# Patient Record
Sex: FEMALE | Race: WHITE
[De-identification: names, ages, dates, MRNs, and addresses within clinical notes are randomized per-mention and may not be internally consistent; named-entity substitution may affect disease eponyms.]

---

## 2018-09-04 ENCOUNTER — HOSPITAL ENCOUNTER (OUTPATIENT)
Dept: HOSPITAL 47 - RADMAMWWP | Age: 58
Discharge: HOME | End: 2018-09-04
Attending: OBSTETRICS & GYNECOLOGY
Payer: COMMERCIAL

## 2018-09-04 DIAGNOSIS — Z12.31: Primary | ICD-10-CM

## 2018-09-04 PROCEDURE — 77063 BREAST TOMOSYNTHESIS BI: CPT

## 2018-09-04 PROCEDURE — 77067 SCR MAMMO BI INCL CAD: CPT

## 2018-09-06 NOTE — MM
Reason for exam: screening  (asymptomatic).

Last mammogram was performed 1 year and 10 months ago.



History:

Patient is postmenopausal and has history of other cancer at age 54.

Family history of breast cancer in aunt at age 60.

Reductions of both breasts, February 2005.

Took hormonal contraceptives for 2 years.



Physical Findings:

A clinical breast exam by your physician is recommended on an annual basis and 

results should be correlated with mammographic findings.



MG 3D Screening Mammo W/Cad

Bilateral CC and MLO view(s) were taken.

Prior study comparison: November 14, 2016, right breast MG diagnostic mammo RT w 

CAD.  May 10, 2016, right breast MG work up mamm w CAD RT.

The breast tissue is heterogeneously dense. This may lower the sensitivity of 

mammography.  No significant changes when compared with prior studies.





ASSESSMENT: Benign, BI-RAD 2



RECOMMENDATION:

Routine screening mammogram of both breasts in 1 year.

## 2018-11-14 ENCOUNTER — HOSPITAL ENCOUNTER (OUTPATIENT)
Dept: HOSPITAL 47 - RADXRMAIN | Age: 58
End: 2018-11-14
Attending: NURSE PRACTITIONER
Payer: COMMERCIAL

## 2018-11-14 DIAGNOSIS — M25.552: Primary | ICD-10-CM

## 2018-11-14 PROCEDURE — 73502 X-RAY EXAM HIP UNI 2-3 VIEWS: CPT

## 2018-11-14 NOTE — XR
EXAMINATION TYPE: XR Hip Complete LT

 

DATE OF EXAM: 11/14/2018

 

CLINICAL HISTORY: Left hip pain for 3 weeks.

 

TECHNIQUE:  AP and frogleg views of the left hip are obtained.

 

COMPARISON: None.

 

FINDINGS:  There is no acute fracture/dislocation evident in the left hip. There is mild to minimal a
xial joint space narrowing with minimal acetabular spurring.  The overlying soft tissue appears unrem
arkable.

 

IMPRESSION: As above.

## 2020-01-06 ENCOUNTER — HOSPITAL ENCOUNTER (OUTPATIENT)
Dept: HOSPITAL 47 - RADMAMWWP | Age: 60
Discharge: HOME | End: 2020-01-06
Attending: OBSTETRICS & GYNECOLOGY
Payer: COMMERCIAL

## 2020-01-06 DIAGNOSIS — Z12.31: Primary | ICD-10-CM

## 2020-01-06 PROCEDURE — 77067 SCR MAMMO BI INCL CAD: CPT

## 2020-01-06 PROCEDURE — 77063 BREAST TOMOSYNTHESIS BI: CPT

## 2020-01-16 ENCOUNTER — HOSPITAL ENCOUNTER (OUTPATIENT)
Dept: HOSPITAL 47 - RADMAMWWP | Age: 60
End: 2020-01-16
Attending: OBSTETRICS & GYNECOLOGY
Payer: COMMERCIAL

## 2020-01-16 DIAGNOSIS — R92.8: Primary | ICD-10-CM

## 2020-01-16 PROCEDURE — 77061 BREAST TOMOSYNTHESIS UNI: CPT

## 2020-01-16 PROCEDURE — 77065 DX MAMMO INCL CAD UNI: CPT

## 2020-01-17 NOTE — USB
Reason for exam: additional evaluation requested from abnormal screening.



History:

Patient is postmenopausal and has history of other cancer at age 54.

Family history of breast cancer in aunt at age 60.

Reductions of both breasts, February 2005.

Took hormonal contraceptives for 2 years.



US Breast Workup RT

Right complete breast ultrasound includes all four quadrants, the retroareolar 

region and axilla. Finding demonstrates a 0.3 x 0.3 x 0.3cm lipoma at 4 o'clock. 

On 3D the asymmetry appears linear and could relate to scar. Patient has history 

of breast reduction. 6 month follow up recommended with consideration for MRI 

breasts.



These results were verbally communicated with the patient and result sheet given 

to the patient on 1/16/20.





ASSESSMENT: Probably benign, BI-RAD 3



RECOMMENDATION:

Follow-up diagnostic mammogram of the right breast in 6 months.

MRI could also be considered.

## 2020-01-17 NOTE — MM
Reason for exam: additional evaluation requested from abnormal screening.

Last mammogram was performed less than 1 month ago.



History:

Patient is postmenopausal and has history of other cancer at age 54.

Family history of breast cancer in aunt at age 60.

Reductions of both breasts, February 2005.

Took hormonal contraceptives for 2 years.



Physical Findings:

Nurse did not find any significant physical abnormalities on exam.



MG 3D Work Up W/Cad RT

Spot compression CC, spot compression MLO, and LM view(s) were taken of the right 

breast.

Prior study comparison: January 6, 2020, bilateral MG 3d screening mammo w/cad.  

September 4, 2018, bilateral MG 3d screening mammo w/cad.

The breast tissue is heterogeneously dense. This may lower the sensitivity of 

mammography.  Right posterior depth asymmetry persists on CC but is deep along the

pectoralis and not seen on lateral or spot MLO.



These results were verbally communicated with the patient and result sheet given 

to the patient on 1/16/20.





ASSESSMENT: Incomplete: need additional imaging evaluation, BI-RAD 0



RECOMMENDATION:

Ultrasound of the right breast.

## 2023-08-31 ENCOUNTER — HOSPITAL ENCOUNTER (OUTPATIENT)
Dept: HOSPITAL 47 - RADMAMWWP | Age: 63
Discharge: HOME | End: 2023-08-31
Attending: FAMILY MEDICINE
Payer: COMMERCIAL

## 2023-08-31 DIAGNOSIS — Z80.3: ICD-10-CM

## 2023-08-31 DIAGNOSIS — R92.8: Primary | ICD-10-CM

## 2023-08-31 DIAGNOSIS — Z78.0: ICD-10-CM

## 2023-08-31 PROCEDURE — 77062 BREAST TOMOSYNTHESIS BI: CPT

## 2023-08-31 PROCEDURE — 77066 DX MAMMO INCL CAD BI: CPT

## 2023-09-14 NOTE — MM
Reason for Exam: Additional evaluation requested from prior study. 

Last mammogram was performed 2 year(s) and 5 month(s) ago. 





Patient History: 

Menarche at age 13. First Full-Term Pregnancy at age 21. Postmenopausal. Other cancer, age 54.

Patient used Hormonal Contraceptives for 2 years. 02/2005, Bilateral Reduction.

Maternal aunt had breast cancer, age 60. 





Risk Values: 

Venita 5 year model risk: 1.3%.

NCI Lifetime model risk: 6.4%.





Prior Study Comparison: 

9/4/2018 Bilateral Screening Mammogram, [facility]. 1/6/2020 Bilateral Screening Mammogram, [facility]. 1/16/2020

Right Diagnostic Mammogram, [facility]. 3/9/2021 Bilateral MG 3D diag mammo w/cad MJ - 2, Unknown. 





Tissue Density: 

The breast tissue is heterogeneously dense. This may lower the sensitivity of mammography.





Findings: 

Analyzed By CAD. 

No new suspicious mass of the breast. No architectural distortion. No group of microcalcifications

within either breast. Stable breast tissue pattern. 





Overall Assessment: Benign, BI-RAD 2





Management: 

Screening Mammogram of both breasts in 1 year.

A clinical breast exam by your physician is recommended on an annual basis and results should be

correlated with mammographic findings.  This exam should not preclude additional follow-up of

suspicious palpable abnormalities.  Results were given to the patient verbally at the time of exam.



Note on Venita scores and lifetime risk:

1. A Venita score greater than 3% is considered moderate risk. If this is the case, consider

specialist referral to assess eligibility for a risk reducing agent.

If overall lifetime risk for the development of breast cancer is 20% or higher, the patient may

qualify for future screening with alternating mammogram and breast MRI.



Electronically signed and approved by: Cristino Hurtado D.O.